# Patient Record
Sex: FEMALE | Race: WHITE
[De-identification: names, ages, dates, MRNs, and addresses within clinical notes are randomized per-mention and may not be internally consistent; named-entity substitution may affect disease eponyms.]

---

## 2017-07-03 ENCOUNTER — HOSPITAL ENCOUNTER (EMERGENCY)
Dept: HOSPITAL 80 - CED | Age: 37
Discharge: HOME | End: 2017-07-03
Payer: COMMERCIAL

## 2017-07-03 VITALS
OXYGEN SATURATION: 98 % | SYSTOLIC BLOOD PRESSURE: 152 MMHG | HEART RATE: 89 BPM | RESPIRATION RATE: 18 BRPM | TEMPERATURE: 98.6 F | DIASTOLIC BLOOD PRESSURE: 97 MMHG

## 2017-07-03 DIAGNOSIS — T69.9XXA: Primary | ICD-10-CM
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
## 2017-07-03 NOTE — EDPHY
H & P


Time Seen by Provider: 07/03/17 00:41


HPI/ROS: 





36-year-old female presents complaining of rash to right lower back following 

use of an ice pack that was extremely cold.





Review of systems


General no fever no chills no weakness


HEENT no eye pain no eye discharge. No eye redness, no sore throat


Respiratory no cough, no shortness of breath


Cardiac no chest pain, no peripheral edema


GI no abdominal pain, no diarrhea, no constipation, no nausea, no vomiting


  no flank pain, no hematuria, no dysuria


Musculoskeletal no myalgias, no joint pain


Heme  no easy bruising, no easy bleeding


Endo no polyuria, no polydipsia


Skin positive rashes, no pruritus


Neuro no syncope, no dizziness, no headaches


Psych is no suicidal ideation, no homicidal ideation





Past Medical/Surgical History: 





Chronic back pain


Social History: 





Denies alcohol or drug use


Smoking Status: Never smoked


Physical Exam: 


36-year-old female


Alert and oriented in no acute distress nontoxic appearance, afebrile


Atraumatic normocephalic


Neck no JVD


Lungs clear to auscultation, no respiratory distress


Heart regular rate and rhythm


Extremities no cyanosis clubbing edema





Skin-right lower back paralumbar area with multiple areas of erythema and 

induration, no vesicles


Constitutional: 


 Initial Vital Signs











Temperature (C)  37 C   07/03/17 00:39


 


Heart Rate  89   07/03/17 00:39


 


Respiratory Rate  18   07/03/17 00:39


 


Blood Pressure  152/97 H  07/03/17 00:39


 


O2 Sat (%)  98   07/03/17 00:39








 











O2 Delivery Mode               Room Air














Allergies/Adverse Reactions: 


 





celecoxib [From Celebrex] Allergy (Mild, Verified 09/18/16 12:15)


 Rash








Home Medications: 














 Medication  Instructions  Recorded


 


Aleve  08/30/16


 


Tylenol  08/30/16


 


oxyCODONE/APAP 5/325 [Percocet 1 - 2 tab PO Q6H PRN #16 tab 08/30/16





5/325 (*)]  


 


Methocarbamol [Robaxin 500 mg (*)] 1,000 mg PO QID 09/18/16














Medical Decision Making


ED Course/Re-evaluation: 





Patient seen and evaluated for rash to right paralumbar area following use of 

cold pack.





Impression


Cold injury


Consistent with partial thickness burn





Plan


Nonsteroidal anti-inflammatory


Silvadene, thin layer daily for approximately 10 days


Follow up with primary care physician





Departure





- Departure


Disposition: Home, Routine, Self-Care


Clinical Impression: 


 Cold injury





Condition: Good


Instructions:  Silver Sulfadiazine (On the skin), Frostbite (ED), Second Degree 

Burn (ED)


Additional Instructions: 


Apply thin layer once a day to areas significant skin involvement.





Recheck with your primary care physician in 3-5 days.





Return to emergency as needed

## 2017-07-21 ENCOUNTER — HOSPITAL ENCOUNTER (EMERGENCY)
Dept: HOSPITAL 80 - FED | Age: 37
LOS: 1 days | Discharge: HOME | End: 2017-07-22
Payer: COMMERCIAL

## 2017-07-21 VITALS — TEMPERATURE: 98.1 F

## 2017-07-21 DIAGNOSIS — E86.9: ICD-10-CM

## 2017-07-21 DIAGNOSIS — R51: Primary | ICD-10-CM

## 2017-07-21 LAB
% IMMATURE GRANULYOCYTES: 0.2 % (ref 0–1.1)
ABSOLUTE IMMATURE GRANULOCYTES: 0.02 10^3/UL (ref 0–0.1)
ABSOLUTE NRBC COUNT: 0 10^3/UL (ref 0–0.01)
ADD DIFF?: NO
ADD MORPH?: NO
ADD SCAN?: NO
ANION GAP SERPL CALC-SCNC: 15 MEQ/L (ref 8–16)
ATYPICAL LYMPHOCYTE FLAG: 10 (ref 0–99)
CALCIUM SERPL-MCNC: 9.9 MG/DL (ref 8.5–10.4)
CHLORIDE SERPL-SCNC: 101 MEQ/L (ref 97–110)
CO2 SERPL-SCNC: 20 MEQ/L (ref 22–31)
CREAT SERPL-MCNC: 0.8 MG/DL (ref 0.6–1)
ERYTHROCYTE [DISTWIDTH] IN BLOOD BY AUTOMATED COUNT: 12.2 % (ref 11.5–15.2)
FRAGMENT RBC FLAG: 0 (ref 0–99)
GFR SERPL CREATININE-BSD FRML MDRD: > 60 ML/MIN/{1.73_M2}
GLUCOSE SERPL-MCNC: 108 MG/DL (ref 70–100)
HCT VFR BLD CALC: 39.1 % (ref 38–47)
HGB BLD-MCNC: 13.5 G/DL (ref 12.6–16.3)
LEFT SHIFT FLG: 0 (ref 0–99)
LIPEMIA HEMOLYSIS FLAG: 90 (ref 0–99)
MCH RBC BLDCO QN: 31.3 PG (ref 27.9–34.1)
MCHC RBC AUTO-ENTMCNC: 34.5 G/DL (ref 32.4–36.7)
MCV RBC AUTO: 90.7 FL (ref 81.5–99.8)
NRBC-AUTO%: 0 % (ref 0–0.2)
PLATELET # BLD: 325 10^3/UL (ref 150–400)
PLATELET CLUMPS FLAG: 10 (ref 0–99)
PMV BLD AUTO: 10.1 FL (ref 8.7–11.7)
POTASSIUM SERPL-SCNC: 4.1 MEQ/L (ref 3.5–5.2)
RBC # BLD AUTO: 4.31 10^6/UL (ref 4.18–5.33)
SODIUM SERPL-SCNC: 136 MEQ/L (ref 134–144)

## 2017-07-21 NOTE — EDPHY
H & P


Stated Complaint: headache after back procedure on 7/19


Time Seen by Provider: 07/21/17 22:17


HPI/ROS: 





CHIEF COMPLAINT:  Headache





HISTORY OF PRESENT ILLNESS:  The patient is a 36-year-old female with chronic 

back pain who comes to the emergency department complaining of a headache for 

the last 3 days ever since she had a discogram at Dr. Gordon office in [place].  She states that she had her L4-L5 and L5-S1 disc injected with contrast 

for imaging purposes.  This was done on Wednesday morning.  She developed a 

mild headache and wants the evening that is been progressively worsening over 

the last 2 and half days.  She has not had a fever.  It is not positional.  She 

has not had any vision changes.  She does not typically get headaches.  She is 

not on any blood thinners.  No neurologic deficits.








REVIEW OF SYSTEMS:


Constitutional:  denies: chills, fever, recent illness, recent injury


EENTM: denies: blurred vision, double vision, nose congestion


Respiratory: denies: cough, shortness of breath


Cardiac: denies: chest pain, irregular heart rate, lightheadedness, palpitations


Gastrointestinal/Abdominal: denies: abdominal pain, diarrhea, nausea, vomiting, 

blood streaked stools


Genitourinary: denies: dysuria, frequency, hematuria, pain


Musculoskeletal:  see HPI


Skin: denies: lesions, rash, jaundice, bruising


Neurological:  see HPI, denies:  numbness, paresthesia, tingling, dizziness, 

weakness


Hematologic/Lymphatic: denies: blood clots, easy bleeding, easy bruising


Immunologic/allergic: denies: HIV/AIDS, transplant








EXAM:


GENERAL:  Well-appearing, well-nourished and in no acute distress.


HEAD:  Atraumatic, normocephalic.


EYES:  Pupils equal round and reactive to light, extraocular movements intact, 

sclera anicteric, conjunctiva are normal.


ENT:  TMs normal, nares patent, oropharynx clear without exudates.  Moist 

mucous membranes.


NECK:  Normal range of motion, supple without lymphadenopathy or JVD.


LUNGS:  Breath sounds clear to auscultation bilaterally and equal.  No wheezes 

rales or rhonchi.


HEART:  Regular rate and rhythm without murmurs, rubs or gallops.


ABDOMEN:  Soft, nontender, normoactive bowel sounds.  No guarding, no rebound.  

No masses appreciated.


BACK:  No CVA tenderness, no spinal tenderness, step-offs or deformities


EXTREMITIES:  Normal range of motion, no pitting or edema.  No clubbing or 

cyanosis.


NEUROLOGICAL:  Cranial nerves II through XII grossly intact.  Normal speech, 

normal gait.  5/5 strength, normal movement in all extremities, normal sensation


PSYCH:  Normal mood, normal affect.


SKIN:  Warm, dry, normal turgor, no visible rashes or lesions.








Source: Patient


Exam Limitations: No limitations





- Personal History


LMP (Females 10-55): Now


Current Tetanus/Diphtheria Vaccine: Yes


Current Tetanus Diphtheria and Acellular Pertussis (TDAP): Yes


Tetanus Vaccine Date: <5years





- Medical/Surgical History


Hx Asthma: No


Hx Chronic Respiratory Disease: No


Hx Diabetes: No


Hx Cardiac Disease: No


Hx Renal Disease: No


Hx Cirrhosis: No


Hx Alcoholism: No


Hx HIV/AIDS: No


Hx Splenectomy or Spleen Trauma: No


Other PMH: med hx-gerd.  surg-ayden,thumb-rt, wrist-rt, lumpectomy(lipomas 

multiple)





- Social History


Smoking Status: Never smoked


Alcohol Use: Sober


Drug Use: None


Constitutional: 


 Initial Vital Signs











Temperature (C)  36.7 C   07/21/17 20:45


 


Heart Rate  89   07/21/17 20:45


 


Respiratory Rate  18   07/21/17 20:45


 


Blood Pressure  135/101 H  07/21/17 20:45


 


O2 Sat (%)  98   07/21/17 20:45








 











O2 Delivery Mode               Room Air,Nasal Cannula














Allergies/Adverse Reactions: 


 





celecoxib [From Celebrex] Allergy (Mild, Verified 09/18/16 12:15)


 Rash








Home Medications: 














 Medication  Instructions  Recorded


 


Aleve  08/30/16


 


oxyCODONE/APAP 5/325 [Percocet 1 - 2 tab PO Q6H PRN #16 tab 08/30/16





5/325 (*)]  


 


Methocarbamol [Robaxin 500 mg (*)] 1,000 mg PO QID 09/18/16














Medical Decision Making





- Diagnostics


Imaging: Discussed imaging studies w/ On call Radiologist


ED Course/Re-evaluation: 





I discussed the case with Radiology and they state there is a possibility for a 

dural leak causing her headache however the patient's symptoms are not 

positional.  I discussed the case with Dr. Andrade who is on-call for 

Neurosurgery.  We made attempts to contact Dr. Gordon office for unable to find 

anyone on-call.  Dr. Andrade felt that this was less likely to be dural headache 

because it is not positional but that the patient could likely be treated as 

outpatient status.  He suggested   We treated for pain and then she take 

caffeine in the morning.





11:15 p.m. I discussed the test results with the patient.  She is reassured but 

is asking for some more headache medication.  We will continue to observe.  I 

will treat her with Toradol.





12:00 a.m. the patient is feeling much better and is eager to go home.  She 

agrees with the plan.  She declines further workup or testing at this time.


Differential Diagnosis: 





Partial list of the Differential diagnosis considered include but were not 

limited to; headache, migraine, post LP headache and although unlikely based on 

the history and physical exam, I also considered trauma, infection, tumor.  I 

discussed these differential diagnoses and the plan with the patient as well as 

the usual and expected course.  The patient understands that the diagnosis is 

provisional and that in medicine we are not always correct and that further 

workup is often warranted.  Usual and customary warnings were given.  All of 

the patient's questions were answered.  The patient was instructed to return to 

the emergency department should the symptoms at all worsen or return, otherwise 

to followup with the physician as we discussed.





- Data Points


Laboratory Results: 


 Laboratory Results





 07/21/17 21:00 





 07/21/17 21:00 








Medications Given: 


 








Discontinued Medications





Dexamethasone (Decadron Injection)  10 mg IVP EDNOW ONE


   Stop: 07/21/17 22:28


   Last Admin: 07/21/17 22:50 Dose:  10 mg


Diphenhydramine HCl (Benadryl Injection)  25 mg IVP EDNOW ONE


   Stop: 07/21/17 22:28


   Last Admin: 07/21/17 22:50 Dose:  25 mg


Hydromorphone HCl (Dilaudid)  1 mg IVP EDNOW ONE


   Stop: 07/21/17 22:28


   Last Admin: 07/21/17 22:50 Dose:  1 mg


Sodium Chloride (Ns)  1,000 mls @ 0 mls/hr IV ONCE ONE; Wide Open


   PRN Reason: Protocol


   Stop: 07/21/17 22:28


   Last Admin: 07/21/17 22:50 Dose:  1,000 mls


Ketorolac Tromethamine (Toradol)  30 mg IVP EDNOW ONE


   Stop: 07/21/17 23:59


   Last Admin: 07/22/17 00:02 Dose:  30 mg


Metoclopramide HCl (Reglan Injection)  10 mg IVP EDNOW ONE


   Stop: 07/21/17 22:28


   Last Admin: 07/21/17 22:50 Dose:  10 mg








Departure





- Departure


Disposition: Home, Routine, Self-Care


Clinical Impression: 


Headache


Qualifiers:


 Headache type: unspecified Headache chronicity pattern: acute headache 

Intractability: not intractable Qualified Code(s): R51 - Headache





Condition: Fair


Instructions:  Acute Headache (ED)


Referrals: 


Marquise Gordon MD [Medical Doctor] - As per Instructions

## 2017-07-22 VITALS — DIASTOLIC BLOOD PRESSURE: 88 MMHG | SYSTOLIC BLOOD PRESSURE: 125 MMHG | HEART RATE: 74 BPM | OXYGEN SATURATION: 97 %

## 2017-07-22 VITALS — RESPIRATION RATE: 16 BRPM

## 2017-10-12 ENCOUNTER — HOSPITAL ENCOUNTER (OUTPATIENT)
Dept: HOSPITAL 80 - CIMAGING | Age: 37
End: 2017-10-12
Attending: NEUROLOGICAL SURGERY
Payer: COMMERCIAL

## 2017-10-12 DIAGNOSIS — Z98.890: ICD-10-CM

## 2017-10-12 DIAGNOSIS — Z09: Primary | ICD-10-CM

## 2017-11-16 ENCOUNTER — HOSPITAL ENCOUNTER (OUTPATIENT)
Dept: HOSPITAL 80 - BMCIMAGING | Age: 37
End: 2017-11-16
Attending: PHYSICIAN ASSISTANT
Payer: COMMERCIAL

## 2017-11-16 DIAGNOSIS — Z98.1: ICD-10-CM

## 2017-11-16 DIAGNOSIS — M54.5: Primary | ICD-10-CM

## 2018-01-22 ENCOUNTER — HOSPITAL ENCOUNTER (OUTPATIENT)
Dept: HOSPITAL 80 - CIMAGING | Age: 38
End: 2018-01-22
Attending: NEUROLOGICAL SURGERY
Payer: MEDICAID

## 2018-01-22 DIAGNOSIS — M47.895: ICD-10-CM

## 2018-01-22 DIAGNOSIS — M41.87: Primary | ICD-10-CM

## 2018-01-22 DIAGNOSIS — M47.894: ICD-10-CM

## 2018-03-02 ENCOUNTER — HOSPITAL ENCOUNTER (OUTPATIENT)
Dept: HOSPITAL 80 - BMCIMAGING | Age: 38
End: 2018-03-02
Attending: FAMILY MEDICINE
Payer: COMMERCIAL

## 2018-03-02 DIAGNOSIS — R07.89: Primary | ICD-10-CM

## 2018-03-30 ENCOUNTER — HOSPITAL ENCOUNTER (OUTPATIENT)
Dept: HOSPITAL 80 - CIMAGING | Age: 38
End: 2018-03-30
Attending: PHYSICIAN ASSISTANT
Payer: MEDICAID

## 2018-03-30 DIAGNOSIS — K59.00: ICD-10-CM

## 2018-03-30 DIAGNOSIS — Z09: Primary | ICD-10-CM

## 2018-03-30 DIAGNOSIS — Z98.1: ICD-10-CM

## 2018-05-01 ENCOUNTER — HOSPITAL ENCOUNTER (OUTPATIENT)
Dept: HOSPITAL 80 - FIMAGING | Age: 38
End: 2018-05-01
Attending: GENERAL ACUTE CARE HOSPITAL
Payer: MEDICAID

## 2018-05-01 DIAGNOSIS — F11.20: ICD-10-CM

## 2018-05-01 DIAGNOSIS — G89.4: ICD-10-CM

## 2018-05-01 DIAGNOSIS — M96.1: ICD-10-CM

## 2018-05-01 DIAGNOSIS — M54.17: Primary | ICD-10-CM

## 2018-09-07 ENCOUNTER — HOSPITAL ENCOUNTER (OUTPATIENT)
Dept: HOSPITAL 80 - FIMAGING | Age: 38
End: 2018-09-07
Attending: PHYSICIAN ASSISTANT
Payer: MEDICAID

## 2018-09-07 DIAGNOSIS — Z47.89: Primary | ICD-10-CM

## 2018-09-07 DIAGNOSIS — Z98.1: ICD-10-CM
